# Patient Record
Sex: FEMALE | Race: WHITE | NOT HISPANIC OR LATINO | Employment: UNEMPLOYED | URBAN - METROPOLITAN AREA
[De-identification: names, ages, dates, MRNs, and addresses within clinical notes are randomized per-mention and may not be internally consistent; named-entity substitution may affect disease eponyms.]

---

## 2023-10-03 ENCOUNTER — TELEPHONE (OUTPATIENT)
Dept: OBSTETRICS AND GYNECOLOGY | Facility: CLINIC | Age: 63
End: 2023-10-03
Payer: MEDICARE

## 2023-10-03 DIAGNOSIS — N32.81 OAB (OVERACTIVE BLADDER): Primary | ICD-10-CM

## 2023-10-04 RX ORDER — OXYBUTYNIN CHLORIDE 10 MG/1
10 TABLET, EXTENDED RELEASE ORAL DAILY
Qty: 30 TABLET | Refills: 1 | Status: SHIPPED | OUTPATIENT
Start: 2023-10-04 | End: 2023-11-11 | Stop reason: SDUPTHER

## 2023-11-10 PROBLEM — N63.0 LUMP OR MASS IN BREAST: Status: ACTIVE | Noted: 2023-11-10

## 2023-11-10 PROBLEM — N32.81 OVERACTIVE BLADDER: Status: ACTIVE | Noted: 2023-11-10

## 2023-11-10 PROBLEM — R39.9 LOWER URINARY TRACT SYMPTOMS (LUTS): Status: ACTIVE | Noted: 2023-11-10

## 2023-11-10 PROBLEM — C50.919 BREAST CANCER (MULTI): Status: ACTIVE | Noted: 2023-11-10

## 2023-11-10 PROBLEM — R30.0 DYSURIA: Status: ACTIVE | Noted: 2023-11-10

## 2023-11-10 RX ORDER — ATORVASTATIN CALCIUM 40 MG/1
1 TABLET, FILM COATED ORAL DAILY
COMMUNITY
Start: 2018-02-20

## 2023-11-10 RX ORDER — LANSOPRAZOLE 30 MG/1
CAPSULE, DELAYED RELEASE ORAL
COMMUNITY
Start: 2012-02-07

## 2023-11-10 RX ORDER — BUPROPION HYDROCHLORIDE 150 MG/1
150 TABLET ORAL EVERY MORNING
COMMUNITY
Start: 2023-05-19

## 2023-11-10 RX ORDER — EZETIMIBE 10 MG/1
1 TABLET ORAL DAILY
COMMUNITY
Start: 2017-07-26

## 2023-11-10 RX ORDER — CYCLOBENZAPRINE HCL 10 MG
10 TABLET ORAL 3 TIMES DAILY PRN
COMMUNITY

## 2023-11-10 RX ORDER — SUCRALFATE 1 G/1
1 TABLET ORAL 4 TIMES DAILY
COMMUNITY
Start: 2018-07-26

## 2023-11-10 RX ORDER — NABUMETONE 500 MG/1
500 TABLET, FILM COATED ORAL 2 TIMES DAILY
COMMUNITY
Start: 2023-02-02

## 2023-11-10 RX ORDER — MULTIVITAMIN
TABLET ORAL
COMMUNITY

## 2023-11-10 RX ORDER — CHOLECALCIFEROL (VITAMIN D3) 50 MCG
TABLET ORAL
COMMUNITY

## 2023-11-10 RX ORDER — PRIMIDONE 50 MG/1
4 TABLET ORAL NIGHTLY
COMMUNITY

## 2023-11-10 RX ORDER — FENOFIBRATE 145 MG/1
1 TABLET, FILM COATED ORAL DAILY
COMMUNITY
Start: 2018-07-26

## 2023-11-10 RX ORDER — ESOMEPRAZOLE MAGNESIUM 40 MG/1
40 CAPSULE, DELAYED RELEASE ORAL DAILY
COMMUNITY
Start: 2018-02-20

## 2023-11-10 RX ORDER — FLUTICASONE FUROATE AND VILANTEROL TRIFENATATE 100; 25 UG/1; UG/1
1 POWDER RESPIRATORY (INHALATION) DAILY
COMMUNITY

## 2023-11-10 RX ORDER — ALBUTEROL SULFATE 0.83 MG/ML
SOLUTION RESPIRATORY (INHALATION)
COMMUNITY

## 2023-11-10 RX ORDER — FLUTICASONE PROPIONATE 50 MCG
SPRAY, SUSPENSION (ML) NASAL
COMMUNITY

## 2023-11-10 RX ORDER — ALBUTEROL SULFATE 90 UG/1
2 AEROSOL, METERED RESPIRATORY (INHALATION) EVERY 4 HOURS PRN
COMMUNITY
Start: 2023-05-31

## 2023-11-10 RX ORDER — CITALOPRAM 40 MG/1
40 TABLET, FILM COATED ORAL EVERY MORNING
COMMUNITY

## 2023-11-10 RX ORDER — MELOXICAM 7.5 MG/1
TABLET ORAL
COMMUNITY
Start: 2012-06-14

## 2023-11-11 ENCOUNTER — OFFICE VISIT (OUTPATIENT)
Dept: OBSTETRICS AND GYNECOLOGY | Facility: CLINIC | Age: 63
End: 2023-11-11
Payer: MEDICARE

## 2023-11-11 VITALS
TEMPERATURE: 98 F | BODY MASS INDEX: 26.12 KG/M2 | DIASTOLIC BLOOD PRESSURE: 81 MMHG | HEIGHT: 64 IN | SYSTOLIC BLOOD PRESSURE: 181 MMHG | HEART RATE: 101 BPM | WEIGHT: 153 LBS

## 2023-11-11 DIAGNOSIS — N32.81 OAB (OVERACTIVE BLADDER): ICD-10-CM

## 2023-11-11 DIAGNOSIS — N63.0 BREAST MASS IN FEMALE: ICD-10-CM

## 2023-11-11 DIAGNOSIS — Z01.419 WELL WOMAN EXAM: Primary | ICD-10-CM

## 2023-11-11 PROCEDURE — 99396 PREV VISIT EST AGE 40-64: CPT | Performed by: MIDWIFE

## 2023-11-11 RX ORDER — OXYBUTYNIN CHLORIDE 10 MG/1
10 TABLET, EXTENDED RELEASE ORAL DAILY
Qty: 90 TABLET | Refills: 3 | Status: SHIPPED | OUTPATIENT
Start: 2023-11-11 | End: 2024-11-10

## 2023-11-11 NOTE — PROGRESS NOTES
"Subjective   Patient ID: Sophia Ferraro is a 62 y.o. female who presents for Annual Exam and pt says that she noticed a new lump in right breast June 2023. She also needs to renew Oxybutinin that is working well to manage OAB  PMHx: Right breast CA-- lumpectomy-- no longer followed by oncologist; last pap 2021 NIL Neg: HTN on meds but pt says she did not take yet today; brads 3 mammogram 7/2023     SocHx: not in a relationship, not SA in \"years\"    HPI    Review of Systems   All other systems reviewed and are negative.      Objective   Physical Exam  Constitutional:       Appearance: Normal appearance.   HENT:      Head: Normocephalic.      Right Ear: External ear normal.      Left Ear: External ear normal.      Nose: Nose normal.   Eyes:      Conjunctiva/sclera: Conjunctivae normal.   Neck:      Thyroid: No thyroid mass, thyromegaly or thyroid tenderness.   Cardiovascular:      Rate and Rhythm: Normal rate and regular rhythm.   Pulmonary:      Effort: Pulmonary effort is normal.   Chest:   Breasts:     Right: Normal. No mass.      Left: Normal. No mass.   Abdominal:      Palpations: Abdomen is soft.   Genitourinary:     General: Normal vulva.      Labia:         Right: No rash, tenderness, lesion or injury.         Left: No rash, tenderness, lesion or injury.       Vagina: Normal.      Cervix: Normal.      Uterus: Normal.       Adnexa: Right adnexa normal and left adnexa normal.      Rectum: Normal.   Musculoskeletal:         General: Normal range of motion.      Cervical back: Normal range of motion. No tenderness.   Lymphadenopathy:      Upper Body:      Right upper body: No supraclavicular or axillary adenopathy.      Left upper body: No supraclavicular or axillary adenopathy.   Skin:     General: Skin is warm and dry.   Neurological:      Mental Status: She is alert and oriented to person, place, and time.   Psychiatric:         Mood and Affect: Mood normal.         Behavior: Behavior normal. "         Assessment/Plan   Diagnoses and all orders for this visit:  Well woman exam  Breast mass in female  -     BI mammo bilateral screening tomosynthesis; Future  -     Referral to Breast Surgery; Future  OAB (overactive bladder)  -     oxybutynin XL (Ditropan XL) 10 mg 24 hr tablet; Take 1 tablet (10 mg) by mouth once daily. Do not crush, chew, or split.    Reassurance given re exam findings  Pt advised to FU ASAP with PCP for eval of BP and to take HTN meds as directed  Pt is referred for FU eval of breasts with Dr Ortega given Birads 3 mammogram  Smoking cessation advised  RTO AE/prn

## 2023-11-22 ENCOUNTER — OFFICE VISIT (OUTPATIENT)
Dept: SURGERY | Facility: CLINIC | Age: 63
End: 2023-11-22
Payer: MEDICARE

## 2023-11-22 ENCOUNTER — HOSPITAL ENCOUNTER (OUTPATIENT)
Dept: RADIOLOGY | Facility: EXTERNAL LOCATION | Age: 63
Discharge: HOME | End: 2023-11-22

## 2023-11-22 ENCOUNTER — HOSPITAL ENCOUNTER (OUTPATIENT)
Dept: RADIOLOGY | Facility: EXTERNAL LOCATION | Age: 63
Discharge: HOME | End: 2023-11-22
Payer: MEDICARE

## 2023-11-22 VITALS
WEIGHT: 154 LBS | TEMPERATURE: 98.1 F | HEART RATE: 83 BPM | DIASTOLIC BLOOD PRESSURE: 62 MMHG | HEIGHT: 65 IN | BODY MASS INDEX: 25.66 KG/M2 | SYSTOLIC BLOOD PRESSURE: 114 MMHG

## 2023-11-22 DIAGNOSIS — Z85.3 HISTORY OF CANCER OF RIGHT BREAST: Primary | ICD-10-CM

## 2023-11-22 DIAGNOSIS — R92.8 ABNORMAL MAMMOGRAM OF RIGHT BREAST: ICD-10-CM

## 2023-11-22 DIAGNOSIS — N63.0 BREAST MASS IN FEMALE: ICD-10-CM

## 2023-11-22 PROCEDURE — 99204 OFFICE O/P NEW MOD 45 MIN: CPT | Performed by: SURGERY

## 2023-11-22 NOTE — PROGRESS NOTES
Subjective   Patient ID: Sophia Ferraro is a 62 y.o. female who presents for abnormal mammogram right breast.  She presents for second opinion.  HPI   This a patient who is 15 years status post right partial mastectomy right-sided lymph node biopsy for a T1 N0 M0 triple negative breast cancer.  The patient underwent adjuvant chemotherapy and radiation therapy and recently underwent mammography at [facility].  Had an ultrasound on 7 6-23 which confirmed a lesion in the right breast for which a 6-month follow-up was recommended.    Past Medical History:   Diagnosis Date    Other conditions influencing health status     Breast Cancer    Other conditions influencing health status     Arthritis        Current Outpatient Medications on File Prior to Visit   Medication Sig Dispense Refill    albuterol 2.5 mg /3 mL (0.083 %) nebulizer solution Inhale.      albuterol 90 mcg/actuation inhaler Inhale 2 puffs every 4 hours if needed.      atorvastatin (Lipitor) 40 mg tablet Take 1 tablet (40 mg) by mouth once daily.      Breo Ellipta 100-25 mcg/dose inhaler Inhale 1 puff once daily.      buPROPion XL (Wellbutrin XL) 150 mg 24 hr tablet Take 1 tablet (150 mg) by mouth once daily in the morning.      cholecalciferol (Vitamin D-3) 50 MCG (2000 UT) tablet Take by mouth.      citalopram (CeleXA) 40 mg tablet Take 1 tablet (40 mg) by mouth once daily in the morning.      cyclobenzaprine (Flexeril) 10 mg tablet Take 1 tablet (10 mg) by mouth 3 times a day as needed.      diclofenac sodium 1 % kit Apply topically.      esomeprazole (NexIUM) 40 mg DR capsule Take 1 capsule (40 mg) by mouth once daily.      ezetimibe (Zetia) 10 mg tablet Take 1 tablet (10 mg) by mouth once daily.      fenofibrate (Tricor) 145 mg tablet Take 1 tablet (145 mg) by mouth once daily.      fluticasone (Flonase) 50 mcg/actuation nasal spray Administer into affected nostril(s).      lansoprazole (Prevacid) 30 mg DR capsule Take by mouth.      meloxicam (Mobic) 7.5 mg  tablet Take by mouth.      multivitamin tablet Take by mouth.      nabumetone (Relafen) 500 mg tablet Take 1 tablet (500 mg) by mouth 2 times a day.      onabotulinumtoxinA (Botox) 100 unit injection Inject into the muscle.      oxybutynin XL (Ditropan XL) 10 mg 24 hr tablet Take 1 tablet (10 mg) by mouth once daily. Do not crush, chew, or split. 90 tablet 3    primidone (Mysoline) 50 mg tablet Take 4 tablets (200 mg) by mouth once daily at bedtime.      sucralfate (Carafate) 1 gram tablet Take 1 tablet (1 g) by mouth 4 times a day.       No current facility-administered medications on file prior to visit.        Review of Systems   All other systems reviewed and are negative.      Vitals:    11/22/23 1235   BP: 114/62   Pulse: 83   Temp: 36.7 °C (98.1 °F)        Objective     Physical Exam  Vitals reviewed. Exam conducted with a chaperone present.   Constitutional:       Appearance: Normal appearance.   HENT:      Head: Normocephalic.      Nose: Nose normal.      Mouth/Throat:      Pharynx: Oropharynx is clear.   Cardiovascular:      Rate and Rhythm: Normal rate and regular rhythm.      Heart sounds: Normal heart sounds.   Pulmonary:      Effort: Pulmonary effort is normal.      Breath sounds: Normal breath sounds.   Chest:   Breasts:     Right: Normal.      Left: Normal.   Abdominal:      General: Abdomen is flat.      Palpations: Abdomen is soft. There is no mass.      Tenderness: There is no abdominal tenderness. There is no guarding.      Hernia: No hernia is present.   Musculoskeletal:         General: Normal range of motion.      Cervical back: Normal range of motion.   Skin:     General: Skin is warm.   Neurological:      General: No focal deficit present.   Psychiatric:         Mood and Affect: Mood normal.         Problem List Items Addressed This Visit       Abnormal mammogram of right breast    History of cancer of right breast - Primary     Other Visit Diagnoses              Assessment/Plan   15 years  status post right partial mastectomy, right sentinel lymph node for T1 N0 M0 triple negative carcinoma right breast with abnormal ultrasound.  Recommendation for 6-month follow-up ultrasound of the right breast.    Plan-I discussed the findings with the patient.  Her films were performed at [facility].  We will review her films within our system.  She will undergo a repeat ultrasound of the right breast in January at [facility] and follow-up with me after this she will bring her disc with her.        Rocco Ortega MD

## 2023-11-22 NOTE — PATIENT INSTRUCTIONS
I have reviewed the reports.  You are 15 years after your surgery of the right breast.  You have done extremely well.  I will see you after your ultrasound in January.